# Patient Record
Sex: FEMALE | Race: WHITE | URBAN - METROPOLITAN AREA
[De-identification: names, ages, dates, MRNs, and addresses within clinical notes are randomized per-mention and may not be internally consistent; named-entity substitution may affect disease eponyms.]

---
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
## 2021-02-19 ENCOUNTER — APPOINTMENT (OUTPATIENT)
Dept: URBAN - METROPOLITAN AREA CLINIC 231 | Age: 21
Setting detail: DERMATOLOGY
End: 2021-02-19

## 2021-02-19 DIAGNOSIS — L70.0 ACNE VULGARIS: ICD-10-CM

## 2021-02-19 PROCEDURE — OTHER PRESCRIPTION: OTHER

## 2021-02-19 PROCEDURE — OTHER COUNSELING: OTHER

## 2021-02-19 PROCEDURE — 99213 OFFICE O/P EST LOW 20 MIN: CPT

## 2021-02-19 RX ORDER — CLINDAMYCIN PHOSPHATE AND BENZOYL PEROXIDE 10; 50 MG/G; MG/G
GEL TOPICAL
Qty: 50 | Refills: 2 | Status: ERX | COMMUNITY
Start: 2021-02-19

## 2021-02-19 ASSESSMENT — LOCATION DETAILED DESCRIPTION DERM
LOCATION DETAILED: RIGHT MEDIAL UPPER BACK
LOCATION DETAILED: LEFT SUPERIOR UPPER BACK
LOCATION DETAILED: RIGHT SUPERIOR UPPER BACK

## 2021-02-19 ASSESSMENT — LOCATION SIMPLE DESCRIPTION DERM
LOCATION SIMPLE: RIGHT UPPER BACK
LOCATION SIMPLE: LEFT UPPER BACK

## 2021-02-19 ASSESSMENT — LOCATION ZONE DERM: LOCATION ZONE: TRUNK

## 2021-02-19 NOTE — PROCEDURE: COUNSELING
Benzoyl Peroxide Pregnancy And Lactation Text: This medication is Pregnancy Category C. It is unknown if benzoyl peroxide is excreted in breast milk.
Birth Control Pills Counseling: Birth Control Pill Counseling: I discussed with the patient the potential side effects of OCPs including but not limited to increased risk of stroke, heart attack, thrombophlebitis, deep venous thrombosis, hepatic adenomas, breast changes, GI upset, headaches, and depression.  The patient verbalized understanding of the proper use and possible adverse effects of OCPs. All of the patient's questions and concerns were addressed.
Sarecycline Counseling: Patient advised regarding possible photosensitivity and discoloration of the teeth, skin, lips, tongue and gums.  Patient instructed to avoid sunlight, if possible.  When exposed to sunlight, patients should wear protective clothing, sunglasses, and sunscreen.  The patient was instructed to call the office immediately if the following severe adverse effects occur:  hearing changes, easy bruising/bleeding, severe headache, or vision changes.  The patient verbalized understanding of the proper use and possible adverse effects of sarecycline.  All of the patient's questions and concerns were addressed.
Tazorac Pregnancy And Lactation Text: This medication is not safe during pregnancy. It is unknown if this medication is excreted in breast milk.
Spironolactone Pregnancy And Lactation Text: This medication can cause feminization of the male fetus and should be avoided during pregnancy. The active metabolite is also found in breast milk.
Tetracycline Counseling: Patient counseled regarding possible photosensitivity and increased risk for sunburn.  Patient instructed to avoid sunlight, if possible.  When exposed to sunlight, patients should wear protective clothing, sunglasses, and sunscreen.  The patient was instructed to call the office immediately if the following severe adverse effects occur:  hearing changes, easy bruising/bleeding, severe headache, or vision changes.  The patient verbalized understanding of the proper use and possible adverse effects of tetracycline.  All of the patient's questions and concerns were addressed. Patient understands to avoid pregnancy while on therapy due to potential birth defects.
Tazorac Counseling:  Patient advised that medication is irritating and drying.  Patient may need to apply sparingly and wash off after an hour before eventually leaving it on overnight.  The patient verbalized understanding of the proper use and possible adverse effects of tazorac.  All of the patient's questions and concerns were addressed.
Sarecycline Pregnancy And Lactation Text: This medication is Pregnancy Category D and not consider safe during pregnancy. It is also excreted in breast milk.
Birth Control Pills Pregnancy And Lactation Text: This medication should be avoided if pregnant and for the first 30 days post-partum.
Bactrim Pregnancy And Lactation Text: This medication is Pregnancy Category D and is known to cause fetal risk.  It is also excreted in breast milk.
Erythromycin Pregnancy And Lactation Text: This medication is Pregnancy Category B and is considered safe during pregnancy. It is also excreted in breast milk.
Topical Clindamycin Counseling: Patient counseled that this medication may cause skin irritation or allergic reactions.  In the event of skin irritation, the patient was advised to reduce the amount of the drug applied or use it less frequently.   The patient verbalized understanding of the proper use and possible adverse effects of clindamycin.  All of the patient's questions and concerns were addressed.
Azithromycin Counseling:  I discussed with the patient the risks of azithromycin including but not limited to GI upset, allergic reaction, drug rash, diarrhea, and yeast infections.
Azithromycin Pregnancy And Lactation Text: This medication is considered safe during pregnancy and is also secreted in breast milk.
Bactrim Counseling:  I discussed with the patient the risks of sulfa antibiotics including but not limited to GI upset, allergic reaction, drug rash, diarrhea, dizziness, photosensitivity, and yeast infections.  Rarely, more serious reactions can occur including but not limited to aplastic anemia, agranulocytosis, methemoglobinemia, blood dyscrasias, liver or kidney failure, lung infiltrates or desquamative/blistering drug rashes.
Topical Clindamycin Pregnancy And Lactation Text: This medication is Pregnancy Category B and is considered safe during pregnancy. It is unknown if it is excreted in breast milk.
Topical Sulfur Applications Counseling: Topical Sulfur Counseling: Patient counseled that this medication may cause skin irritation or allergic reactions.  In the event of skin irritation, the patient was advised to reduce the amount of the drug applied or use it less frequently.   The patient verbalized understanding of the proper use and possible adverse effects of topical sulfur application.  All of the patient's questions and concerns were addressed.
Dapsone Counseling: I discussed with the patient the risks of dapsone including but not limited to hemolytic anemia, agranulocytosis, rashes, methemoglobinemia, kidney failure, peripheral neuropathy, headaches, GI upset, and liver toxicity.  Patients who start dapsone require monitoring including baseline LFTs and weekly CBCs for the first month, then every month thereafter.  The patient verbalized understanding of the proper use and possible adverse effects of dapsone.  All of the patient's questions and concerns were addressed.
Isotretinoin Pregnancy And Lactation Text: This medication is Pregnancy Category X and is considered extremely dangerous during pregnancy. It is unknown if it is excreted in breast milk.
Doxycycline Pregnancy And Lactation Text: This medication is Pregnancy Category D and not consider safe during pregnancy. It is also excreted in breast milk but is considered safe for shorter treatment courses.
Benzoyl Peroxide Counseling: Patient counseled that medicine may cause skin irritation and bleach clothing.  In the event of skin irritation, the patient was advised to reduce the amount of the drug applied or use it less frequently.   The patient verbalized understanding of the proper use and possible adverse effects of benzoyl peroxide.  All of the patient's questions and concerns were addressed.
Detail Level: Zone
Include Pregnancy/Lactation Warning?: No
High Dose Vitamin A Pregnancy And Lactation Text: High dose vitamin A therapy is contraindicated during pregnancy and breast feeding.
Isotretinoin Counseling: Patient should get monthly blood tests, not donate blood, not drive at night if vision affected, not share medication, and not undergo elective surgery for 6 months after tx completed. Side effects reviewed, pt to contact office should one occur.
High Dose Vitamin A Counseling: Side effects reviewed, pt to contact office should one occur.
Topical Retinoid Pregnancy And Lactation Text: This medication is Pregnancy Category C. It is unknown if this medication is excreted in breast milk.
Topical Retinoid counseling:  Patient advised to apply a pea-sized amount only at bedtime and wait 30 minutes after washing their face before applying.  If too drying, patient may add a non-comedogenic moisturizer. The patient verbalized understanding of the proper use and possible adverse effects of retinoids.  All of the patient's questions and concerns were addressed.
Erythromycin Counseling:  I discussed with the patient the risks of erythromycin including but not limited to GI upset, allergic reaction, drug rash, diarrhea, increase in liver enzymes, and yeast infections.
Spironolactone Counseling: Patient advised regarding risks of diarrhea, abdominal pain, hyperkalemia, birth defects (for female patients), liver toxicity and renal toxicity. The patient may need blood work to monitor liver and kidney function and potassium levels while on therapy. The patient verbalized understanding of the proper use and possible adverse effects of spironolactone.  All of the patient's questions and concerns were addressed.
Dapsone Pregnancy And Lactation Text: This medication is Pregnancy Category C and is not considered safe during pregnancy or breast feeding.
Topical Sulfur Applications Pregnancy And Lactation Text: This medication is Pregnancy Category C and has an unknown safety profile during pregnancy. It is unknown if this topical medication is excreted in breast milk.
Minocycline Counseling: Patient advised regarding possible photosensitivity and discoloration of the teeth, skin, lips, tongue and gums.  Patient instructed to avoid sunlight, if possible.  When exposed to sunlight, patients should wear protective clothing, sunglasses, and sunscreen.  The patient was instructed to call the office immediately if the following severe adverse effects occur:  hearing changes, easy bruising/bleeding, severe headache, or vision changes.  The patient verbalized understanding of the proper use and possible adverse effects of minocycline.  All of the patient's questions and concerns were addressed.
Doxycycline Counseling:  Patient counseled regarding possible photosensitivity and increased risk for sunburn.  Patient instructed to avoid sunlight, if possible.  When exposed to sunlight, patients should wear protective clothing, sunglasses, and sunscreen.  The patient was instructed to call the office immediately if the following severe adverse effects occur:  hearing changes, easy bruising/bleeding, severe headache, or vision changes.  The patient verbalized understanding of the proper use and possible adverse effects of doxycycline.  All of the patient's questions and concerns were addressed.

## 2021-03-07 NOTE — PROCEDURE: ISOTRETINOIN INITIATION
PRINCIPAL DISCHARGE DIAGNOSIS  Diagnosis: COVID-19  Assessment and Plan of Treatment: CORONAVIRUS INSTRUCTIONS:   Based on your current clinical status and stability, it has been determined that you no longer need hospitalization and can recover while remaining in self-quarantine at home. You should follow the prevention steps below until a healthcare provider or local or state health department says you can return to your normal activities.   1. You should restrict activities outside your home, except for getting medical care.   2. Do not go to work, school, or public areas.   3. Avoid using public transportation, ride-sharing, or taxis.   4. Separate yourself from other people and animals in your home as much as possible.  When you are around other people (e.g., sharing a room or vehicle) you should wear a facemask.  5. Wash your hands often with soap and water for at least 20 seconds, especially after blowing your nose, coughing, or sneezing; going to the bathroom; and before eating or preparing food.  6. Cover your mouth and nose with a tissue when you cough or sneeze. Throw used tissues in a lined trash can. Immediately wash your hands with soap and water for at least 20 seconds  7. High touch surfaces include counters, tabletops, doorknobs, bathroom fixtures, toilets, phones, keyboards, tablets, and bedside tables.  8. Avoid sharing dishes, drinking glasses, cups, eating utensils, towels, or bedding with other people or pets in your home. After using these items, they should be washed thoroughly with soap and water.  You are strongly advised to seek prompt medical attention if your illness worsens or you develop new symptoms like fever or difficulty breathing.   Please call  [phone] to speak with your discharging physician if you have any questions.        SECONDARY DISCHARGE DIAGNOSES  Diagnosis: Dementia  Assessment and Plan of Treatment: HOME CARE INSTRUCTIONS  The care of individuals with dementia is varied and dependent upon the progression of the dementia. The following suggestions are intended for the person living with, or caring for, the person with dementia.  Create a safe environment.  Remove the locks on bathroom doors to prevent the person from accidentally locking himself or herself in.  Use childproof latches on kitchen cabinets and any place where cleaning supplies, chemicals, or alcohol are kept.  Use childproof covers in unused electrical outlets.  Install childproof devices to keep doors and windows secured.  Remove stove knobs or install safety knobs and an automatic shut-off on the stove.  Lower the temperature on water heaters.  Label medicines and keep them locked up.  Secure knives, lighters, matches, power tools, and guns, and keep these items out of reach.  Keep the house free from clutter. Remove rugs or anything that might contribute to a fall.  Remove objects that might break and hurt the person.  Make sure lighting is good, both inside and outside.  Install grab rails as needed.  Use a monitoring device to alert you to falls or other needs for help.   Reduce confusion.  Keep familiar objects and people around.  Use night lights or dim lights at night.  Label items or areas.  Use reminders, notes, or directions for daily activities or tasks.Keep a simple, consistent routine for waking, meals, bathing, dressing, and bedtime  Create a calm, quiet environment.  Place large clocks and calendars prominently.  Display emergency numbers and home address near all telephones..  Have a consistent nighttime routine.  Ensure a regular walking or physical activity schedule. Involve the person in daily activities as much as possible.  Limit napping during the day.  Limit caffeine.  Attend social events that stimulate rather than overwhelm the senses.  Encourage good nutrition and hydration.  Reduce distractions during meal times and snacks..  Monitor chewing and swallowing ability.  Continue with routine vision, hearin    Diagnosis: Afib  Assessment and Plan of Treatment: Atrial fibrillation is the most common heart rhythm problem.  The condition puts you at risk for has stroke and heart attack  Call your doctor if you feel your heart racing or beating unusually, chest tightness or pain, lightheaded, faint, shortness of breath especially with exercise  It is important to take your heart medication as prescribed  You may be on anticoagulation which is very important to take as directed - you may need blood work to monitor drug levels      Diagnosis: Altered mental status, unspecified altered mental status type  Assessment and Plan of Treatment:      PRINCIPAL DISCHARGE DIAGNOSIS  Diagnosis: COVID-19  Assessment and Plan of Treatment: CORONAVIRUS INSTRUCTIONS:   Based on your current clinical status and stability, it has been determined that you no longer need hospitalization and can recover while remaining in self-quarantine at home. You should follow the prevention steps below until a healthcare provider or local or state health department says you can return to your normal activities.   1. Wash your hands often with soap and water for at least 20 seconds, especially after blowing your nose, coughing, or sneezing; going to the bathroom; and before eating or preparing food.  2. Cover your mouth and nose with a tissue when you cough or sneeze. Throw used tissues in a lined trash can. Immediately wash your hands with soap and water for at least 20 seconds  3. High touch surfaces include counters, tabletops, doorknobs, bathroom fixtures, toilets, phones, keyboards, tablets, and bedside tables.  4.  Avoid sharing dishes, drinking glasses, cups, eating utensils, towels, or bedding with other people or pets in your home. After using these items, they should be washed thoroughly with soap and water.  You are strongly advised to seek prompt medical attention if your illness worsens or you develop new symptoms like fever or difficulty breathing.   Please call   to speak with your discharging physician if you have any questions.        SECONDARY DISCHARGE DIAGNOSES  Diagnosis: Afib  Assessment and Plan of Treatment: Atrial fibrillation is the most common heart rhythm problem.  The condition puts you at risk for has stroke and heart attack  Call your doctor if you feel your heart racing or beating unusually, chest tightness or pain, lightheaded, faint, shortness of breath especially with exercise  It is important to take your heart medication as prescribed  You may be on anticoagulation which is very important to take as directed - you may need blood work to monitor drug levels      Diagnosis: Dementia  Assessment and Plan of Treatment: HOME CARE INSTRUCTIONS  The care of individuals with dementia is varied and dependent upon the progression of the dementia. The following suggestions are intended for the person living with, or caring for, the person with dementia.  Create a safe environment.  Remove the locks on bathroom doors to prevent the person from accidentally locking himself or herself in.  Use childproof latches on kitchen cabinets and any place where cleaning supplies, chemicals, or alcohol are kept.  Use childproof covers in unused electrical outlets.  Install childproof devices to keep doors and windows secured.  Remove stove knobs or install safety knobs and an automatic shut-off on the stove.  Lower the temperature on water heaters.  Label medicines and keep them locked up.  Secure knives, lighters, matches, power tools, and guns, and keep these items out of reach.  Keep the house free from clutter. Remove rugs or anything that might contribute to a fall.  Remove objects that might break and hurt the person.  Make sure lighting is good, both inside and outside.  Install grab rails as needed.  Use a monitoring device to alert you to falls or other needs for help.   Reduce confusion.  Keep familiar objects and people around.  Use night lights or dim lights at night.  Label items or areas.  Use reminders, notes, or directions for daily activities or tasks.Keep a simple, consistent routine for waking, meals, bathing, dressing, and bedtime  Create a calm, quiet environment.  Place large clocks and calendars prominently.  Display emergency numbers and home address near all telephones..  Have a consistent nighttime routine.  Ensure a regular walking or physical activity schedule. Involve the person in daily activities as much as possible.  Limit napping during the day.  Limit caffeine.  Attend social events that stimulate rather than overwhelm the senses.  Encourage good nutrition and hydration.  Reduce distractions during meal times and snacks..  Monitor chewing and swallowing ability.  Continue with routine vision, hearin     PRINCIPAL DISCHARGE DIAGNOSIS  Diagnosis: COVID-19  Assessment and Plan of Treatment:   You completed quarantine period in the hospital  Please read below for general precaution for COVID   CORONAVIRUS INSTRUCTIONS:     1. Wash your hands often with soap and water for at least 20 seconds, especially after blowing your nose, coughing, or sneezing; going to the bathroom; and before eating or preparing food.  2. Cover your mouth and nose with a tissue when you cough or sneeze. Throw used tissues in a lined trash can. Immediately wash your hands with soap and water for at least 20 seconds  3. High touch surfaces include counters, tabletops, doorknobs, bathroom fixtures, toilets, phones, keyboards, tablets, and bedside tables.  4.  Avoid sharing dishes, drinking glasses, cups, eating utensils, towels, or bedding with other people or pets in your home. After using these items, they should be washed thoroughly with soap and water.  You are strongly advised to seek prompt medical attention if your illness worsens or you develop new symptoms like fever or difficulty breathing.   Please call  [phone] to speak with your discharging physician if you have any questions.  If  yo udevelop shortness of reath, fever call PMD ASAP         SECONDARY DISCHARGE DIAGNOSES  Diagnosis: Afib  Assessment and Plan of Treatment: Atrial fibrillation is the most common heart rhythm problem.  The condition puts you at risk for has stroke and heart attack  Call your doctor if you feel your heart racing or beating unusually, chest tightness or pain, lightheaded, faint, shortness of breath especially with exercise  It is important to take your heart medication as prescribed  You may be on anticoagulation which is very important to take as directed - you may need blood work to monitor drug levels      Diagnosis: Dementia  Assessment and Plan of Treatment: HOME CARE INSTRUCTIONS  The care of individuals with dementia is varied and dependent upon the progression of the dementia. The following suggestions are intended for the person living with, or caring for, the person with dementia.  Create a safe environment.  Remove the locks on bathroom doors to prevent the person from accidentally locking himself or herself in.  Use childproof latches on kitchen cabinets and any place where cleaning supplies, chemicals, or alcohol are kept.  Use childproof covers in unused electrical outlets.  Install childproof devices to keep doors and windows secured.  Remove stove knobs or install safety knobs and an automatic shut-off on the stove.  Lower the temperature on water heaters.  Label medicines and keep them locked up.  Secure knives, lighters, matches, power tools, and guns, and keep these items out of reach.  Keep the house free from clutter. Remove rugs or anything that might contribute to a fall.  Remove objects that might break and hurt the person.  Make sure lighting is good, both inside and outside.  Install grab rails as needed.  Use a monitoring device to alert you to falls or other needs for help.   Reduce confusion.  Keep familiar objects and people around.  Use night lights or dim lights at night.  Label items or areas.  Use reminders, notes, or directions for daily activities or tasks.Keep a simple, consistent routine for waking, meals, bathing, dressing, and bedtime  Create a calm, quiet environment.  Place large clocks and calendars prominently.  Display emergency numbers and home address near all telephones..  Have a consistent nighttime routine.  Ensure a regular walking or physical activity schedule. Involve the person in daily activities as much as possible.  Limit napping during the day.  Limit caffeine.  Attend social events that stimulate rather than overwhelm the senses.  Encourage good nutrition and hydration.  Reduce distractions during meal times and snacks..  Monitor chewing and swallowing ability.  Continue with routine vision, hearin     PRINCIPAL DISCHARGE DIAGNOSIS  Diagnosis: COVID-19  Assessment and Plan of Treatment:   You completed quarantine period in the hospital  Please read below for general precaution for COVID   CORONAVIRUS INSTRUCTIONS:     1. Wash your hands often with soap and water for at least 20 seconds, especially after blowing your nose, coughing, or sneezing; going to the bathroom; and before eating or preparing food.  2. Cover your mouth and nose with a tissue when you cough or sneeze. Throw used tissues in a lined trash can. Immediately wash your hands with soap and water for at least 20 seconds  3. High touch surfaces include counters, tabletops, doorknobs, bathroom fixtures, toilets, phones, keyboards, tablets, and bedside tables.  4.  Avoid sharing dishes, drinking glasses, cups, eating utensils, towels, or bedding with other people or pets in your home. After using these items, they should be washed thoroughly with soap and water.  You are strongly advised to seek prompt medical attention if your illness worsens or you develop new symptoms like fever or difficulty breathing.   Please call  [phone] to speak with your discharging physician if you have any questions.  If  yo udevelop shortness of reath, fever call PMD ASAP         SECONDARY DISCHARGE DIAGNOSES  Diagnosis: Afib  Assessment and Plan of Treatment: Atrial fibrillation is the most common heart rhythm problem.  The condition puts you at risk for has stroke and heart attack  Call your doctor if you feel your heart racing or beating unusually, chest tightness or pain, lightheaded, faint, shortness of breath especially with exercise  It is important to take your heart medication as prescribed  You may be on anticoagulation which is very important to take as directed - you may need blood work to monitor drug levels      Diagnosis: Dementia  Assessment and Plan of Treatment: HOME CARE INSTRUCTIONS  The care of individuals with dementia is varied and dependent upon the progression of the dementia. The following suggestions are intended for the person living with, or caring for, the person with dementia.  Create a safe environment.  Remove the locks on bathroom doors to prevent the person from accidentally locking himself or herself in.  Use childproof latches on kitchen cabinets and any place where cleaning supplies, chemicals, or alcohol are kept.  Use childproof covers in unused electrical outlets.  Install childproof devices to keep doors and windows secured.  Remove stove knobs or install safety knobs and an automatic shut-off on the stove.  Lower the temperature on water heaters.  Label medicines and keep them locked up.  Secure knives, lighters, matches, power tools, and guns, and keep these items out of reach.  Keep the house free from clutter. Remove rugs or anything that might contribute to a fall.  Remove objects that might break and hurt the person.  Make sure lighting is good, both inside and outside.  Install grab rails as needed.  Use a monitoring device to alert you to falls or other needs for help.   Reduce confusion.  Keep familiar objects and people around.  Use night lights or dim lights at night.  Label items or areas.  Use reminders, notes, or directions for daily activities or tasks.Keep a simple, consistent routine for waking, meals, bathing, dressing, and bedtime  Create a calm, quiet environment.  Place large clocks and calendars prominently.  Display emergency numbers and home address near all telephones..  Have a consistent nighttime routine.  Ensure a regular walking or physical activity schedule. Involve the person in daily activities as much as possible.  Limit napping during the day.  Limit caffeine.  Attend social events that stimulate rather than overwhelm the senses.  Encourage good nutrition and hydration.  Reduce distractions during meal times and snacks..  Monitor chewing and swallowing ability.  Continue with routine vision, hearin    Diagnosis: Heart failure  Assessment and Plan of Treatment: Weigh yourself daily.  If you gain 3lbs in 3 days, or 5lbs in a week call your Health Care Provider.  Do not eat or drink foods containing more than 2000mg of salt (sodium) in your diet every day.  Call your Health Care Provider if you have any swelling or increased swelling in your feet, ankles, and/or stomach.  Take all of your medication as directed.  If you become dizzy call your Health Care Provider.       Patient Reported Weight (Optional - Include Units): 128lbs

## 2021-09-03 ENCOUNTER — APPOINTMENT (OUTPATIENT)
Dept: URBAN - METROPOLITAN AREA CLINIC 231 | Age: 21
Setting detail: DERMATOLOGY
End: 2021-09-04

## 2021-09-03 DIAGNOSIS — L42 PITYRIASIS ROSEA: ICD-10-CM

## 2021-09-03 PROCEDURE — OTHER COUNSELING: OTHER

## 2021-09-03 PROCEDURE — OTHER PRESCRIPTION: OTHER

## 2021-09-03 PROCEDURE — 99213 OFFICE O/P EST LOW 20 MIN: CPT

## 2021-09-03 RX ORDER — TRIAMCINOLONE ACETONIDE 1 MG/G
CREAM TOPICAL BID
Qty: 1 | Refills: 0 | Status: ERX | COMMUNITY
Start: 2021-09-03

## 2021-09-03 ASSESSMENT — LOCATION DETAILED DESCRIPTION DERM
LOCATION DETAILED: RIGHT LATERAL BREAST 7-8:00 REGION
LOCATION DETAILED: LEFT RIB CAGE
LOCATION DETAILED: RIGHT DISTAL POSTERIOR THIGH
LOCATION DETAILED: RIGHT MEDIAL BREAST 5-6:00 REGION

## 2021-09-03 ASSESSMENT — LOCATION SIMPLE DESCRIPTION DERM
LOCATION SIMPLE: RIGHT BREAST
LOCATION SIMPLE: ABDOMEN
LOCATION SIMPLE: RIGHT POSTERIOR THIGH

## 2021-09-03 ASSESSMENT — LOCATION ZONE DERM
LOCATION ZONE: LEG
LOCATION ZONE: TRUNK

## 2021-09-07 ENCOUNTER — RX ONLY (RX ONLY)
Age: 21
End: 2021-09-07

## 2021-09-07 RX ORDER — TRIAMCINOLONE ACETONIDE 1 MG/G
CREAM TOPICAL BID
Qty: 1 | Refills: 0 | Status: ERX

## 2021-12-09 ENCOUNTER — APPOINTMENT (OUTPATIENT)
Dept: URBAN - METROPOLITAN AREA CLINIC 231 | Age: 21
Setting detail: DERMATOLOGY
End: 2021-12-09

## 2021-12-09 DIAGNOSIS — L23.7 ALLERGIC CONTACT DERMATITIS DUE TO PLANTS, EXCEPT FOOD: ICD-10-CM

## 2021-12-09 PROCEDURE — OTHER COUNSELING: OTHER

## 2021-12-09 PROCEDURE — 99213 OFFICE O/P EST LOW 20 MIN: CPT

## 2021-12-09 PROCEDURE — OTHER MIPS QUALITY: OTHER

## 2021-12-09 PROCEDURE — OTHER ADDITIONAL NOTES: OTHER

## 2021-12-09 PROCEDURE — OTHER PRESCRIPTION: OTHER

## 2021-12-09 PROCEDURE — OTHER TREATMENT REGIMEN: OTHER

## 2021-12-09 RX ORDER — METHYLPREDNISOLONE 4 MG/1
TABLET ORAL
Qty: 1 | Refills: 0 | Status: ERX | COMMUNITY
Start: 2021-12-09

## 2021-12-09 RX ORDER — CLOBETASOL PROPIONATE 0.5 MG/G
CREAM TOPICAL
Qty: 30 | Refills: 0 | Status: ERX | COMMUNITY
Start: 2021-12-09

## 2021-12-09 ASSESSMENT — LOCATION ZONE DERM
LOCATION ZONE: ARM
LOCATION ZONE: LEG
LOCATION ZONE: TRUNK

## 2021-12-09 ASSESSMENT — LOCATION DETAILED DESCRIPTION DERM
LOCATION DETAILED: PERIUMBILICAL SKIN
LOCATION DETAILED: LEFT ANTERIOR SHOULDER
LOCATION DETAILED: INFERIOR THORACIC SPINE
LOCATION DETAILED: LEFT ANTERIOR PROXIMAL THIGH

## 2021-12-09 ASSESSMENT — LOCATION SIMPLE DESCRIPTION DERM
LOCATION SIMPLE: LEFT THIGH
LOCATION SIMPLE: LEFT SHOULDER
LOCATION SIMPLE: UPPER BACK
LOCATION SIMPLE: ABDOMEN

## 2021-12-09 NOTE — PROCEDURE: ADDITIONAL NOTES
Additional Notes: Recommended making an appointment with Dr. Niranjan Hull
Detail Level: Simple
Render Risk Assessment In Note?: no

## 2021-12-09 NOTE — PROCEDURE: MIPS QUALITY
Quality 431: Preventive Care And Screening: Unhealthy Alcohol Use - Screening: Patient not identified as an unhealthy alcohol user when screened for unhealthy alcohol use using a systematic screening method
Detail Level: Detailed
Quality 130: Documentation Of Current Medications In The Medical Record: Current Medications Documented
Quality 110: Preventive Care And Screening: Influenza Immunization: Influenza Immunization not Administered for Documented Reasons.
Quality 226: Preventive Care And Screening: Tobacco Use: Screening And Cessation Intervention: Patient screened for tobacco use and is an ex/non-smoker

## 2021-12-23 ENCOUNTER — APPOINTMENT (OUTPATIENT)
Dept: URBAN - METROPOLITAN AREA CLINIC 231 | Age: 21
Setting detail: DERMATOLOGY
End: 2021-12-23

## 2021-12-23 DIAGNOSIS — L23.7 ALLERGIC CONTACT DERMATITIS DUE TO PLANTS, EXCEPT FOOD: ICD-10-CM

## 2021-12-23 PROBLEM — L30.9 DERMATITIS, UNSPECIFIED: Status: ACTIVE | Noted: 2021-12-23

## 2021-12-23 PROCEDURE — 11104 PUNCH BX SKIN SINGLE LESION: CPT

## 2021-12-23 PROCEDURE — OTHER BIOPSY BY PUNCH METHOD: OTHER

## 2021-12-23 PROCEDURE — OTHER COUNSELING: OTHER

## 2021-12-23 PROCEDURE — OTHER PRESCRIPTION: OTHER

## 2021-12-23 RX ORDER — CLOBETASOL PROPIONATE 0.5 MG/G
CREAM TOPICAL
Qty: 30 | Refills: 0 | Status: ERX

## 2021-12-23 ASSESSMENT — LOCATION SIMPLE DESCRIPTION DERM
LOCATION SIMPLE: LEFT THIGH
LOCATION SIMPLE: UPPER BACK
LOCATION SIMPLE: LEFT SHOULDER
LOCATION SIMPLE: RIGHT POSTERIOR THIGH
LOCATION SIMPLE: ABDOMEN

## 2021-12-23 ASSESSMENT — LOCATION DETAILED DESCRIPTION DERM
LOCATION DETAILED: INFERIOR THORACIC SPINE
LOCATION DETAILED: RIGHT DISTAL POSTERIOR THIGH
LOCATION DETAILED: PERIUMBILICAL SKIN
LOCATION DETAILED: LEFT ANTERIOR PROXIMAL THIGH
LOCATION DETAILED: LEFT ANTERIOR SHOULDER

## 2021-12-23 ASSESSMENT — LOCATION ZONE DERM
LOCATION ZONE: ARM
LOCATION ZONE: LEG
LOCATION ZONE: TRUNK

## 2021-12-23 NOTE — PROCEDURE: BIOPSY BY PUNCH METHOD
Path Notes (To The Dermatopathologist): 3mm\\Jose Manuel
Post-Care Instructions: I reviewed with the patient in detail post-care instructions. Patient is to keep the biopsy site dry overnight, and then apply bacitracin twice daily until healed. Patient may apply hydrogen peroxide soaks to remove any crusting.
Additional Anesthesia Volume In Cc (Will Not Render If 0): 0
Billing Type: Third-Party Bill
Validate That Anesthesia Is Not 0: No
Wound Care: Petrolatum
Biopsy Type: H and E
Anesthesia Type: 1% lidocaine with epinephrine
Home Suture Removal Text: Patient was provided a home suture removal kit and will remove their sutures at home.  If they have any questions or difficulties they will call the office.
Information: Selecting Yes will display possible errors in your note based on the variables you have selected. This validation is only offered as a suggestion for you. PLEASE NOTE THAT THE VALIDATION TEXT WILL BE REMOVED WHEN YOU FINALIZE YOUR NOTE. IF YOU WANT TO FAX A PRELIMINARY NOTE YOU WILL NEED TO TOGGLE THIS TO 'NO' IF YOU DO NOT WANT IT IN YOUR FAXED NOTE.
Suture Removal: 12 days
Was A Bandage Applied: Yes
Hemostasis: None
Punch Size In Mm: 3
Epidermal Sutures: 4-0 Nylon
Dressing: bandage
Detail Level: Detailed
Notification Instructions: Patient will be notified of biopsy results. However, patient instructed to call the office if not contacted within 2 weeks.
Consent: Written consent was obtained and risks were reviewed including but not limited to scarring, infection, bleeding, scabbing, incomplete removal, nerve damage and allergy to anesthesia.
Anesthesia Volume In Cc (Will Not Render If 0): 0.5

## 2022-01-03 ENCOUNTER — APPOINTMENT (OUTPATIENT)
Dept: URBAN - METROPOLITAN AREA CLINIC 231 | Age: 22
Setting detail: DERMATOLOGY
End: 2022-01-03

## 2022-01-03 DIAGNOSIS — L23.7 ALLERGIC CONTACT DERMATITIS DUE TO PLANTS, EXCEPT FOOD: ICD-10-CM

## 2022-01-03 PROBLEM — L30.9 DERMATITIS, UNSPECIFIED: Status: ACTIVE | Noted: 2022-01-03

## 2022-01-03 PROCEDURE — OTHER SUTURE REMOVAL (NO GLOBAL PERIOD): OTHER

## 2022-01-03 PROCEDURE — 99213 OFFICE O/P EST LOW 20 MIN: CPT

## 2022-01-03 PROCEDURE — OTHER PRESCRIPTION MEDICATION MANAGEMENT: OTHER

## 2022-01-03 PROCEDURE — OTHER MIPS QUALITY: OTHER

## 2022-01-03 PROCEDURE — OTHER COUNSELING: OTHER

## 2022-01-03 ASSESSMENT — LOCATION SIMPLE DESCRIPTION DERM
LOCATION SIMPLE: LEFT THIGH
LOCATION SIMPLE: UPPER BACK
LOCATION SIMPLE: ABDOMEN
LOCATION SIMPLE: RIGHT THIGH
LOCATION SIMPLE: LEFT SHOULDER

## 2022-01-03 ASSESSMENT — LOCATION ZONE DERM
LOCATION ZONE: LEG
LOCATION ZONE: TRUNK
LOCATION ZONE: ARM

## 2022-01-03 ASSESSMENT — LOCATION DETAILED DESCRIPTION DERM
LOCATION DETAILED: LEFT ANTERIOR PROXIMAL THIGH
LOCATION DETAILED: LEFT ANTERIOR SHOULDER
LOCATION DETAILED: INFERIOR THORACIC SPINE
LOCATION DETAILED: PERIUMBILICAL SKIN
LOCATION DETAILED: RIGHT ANTERIOR PROXIMAL THIGH

## 2022-01-03 NOTE — PROCEDURE: MIPS QUALITY
Detail Level: Detailed
Quality 226: Preventive Care And Screening: Tobacco Use: Screening And Cessation Intervention: Patient screened for tobacco use and is an ex/non-smoker
Quality 431: Preventive Care And Screening: Unhealthy Alcohol Use - Screening: Patient not identified as an unhealthy alcohol user when screened for unhealthy alcohol use using a systematic screening method
Quality 130: Documentation Of Current Medications In The Medical Record: Current Medications Documented
Quality 110: Preventive Care And Screening: Influenza Immunization: Influenza Immunization Administered during Influenza season

## 2023-05-22 NOTE — PROCEDURE: ISOTRETINOIN MONITORING
Geriatric Psychosocial Assessment and Care Plan   Participants in Assessment  Patient Stephanie, her  John and son Jass.     Patient Concerns  Tell us what brings you in today?  Stephanie expresses concerns with her memory and desire to get 's license back. Reporting, “I am disappointed that I am not able to drive.”  John expresses concerns with Stephanie's desire to hike independently and has been looking at technology to assist including [employer] GPS tracker. Asking how to give Stephanie autonomy in the best possible ways.  Per Jass, struggles with negative content in emails. Reports Stephanie's brother  by suicide a few years ago. Reports that his mom often feels sad about her borther and talks about this loss frequently. Father also passed 2 years ago, but does not appear to be talked about as much as her brother. Jass wonders if his mom would benefit from therapy.     Of note, Stephanie has a significant delay in her ability to respond to questions. At times she is not able to respond at all. Word finding difficulty apparent.     Strengths/What Matters  What gives meaning and julia to your life?  “Hiking. It's my favorite. Or go walking. Yoga everyday. Family. Going to Jass's house on Saturday. Watching my games. I'm a big Bears fan. My dogs.”  What your limitations? “None of that going on.”    Family & Support System     How long have you been ? 40 years in October.     Children (First name; state where living):   3 sons; Mike in [place], Eldon in [place] and Jass in [place].  Jass works for [employer] and is familiar with geriatric services in the area. Jass has reached out to this team previously for ideas on how to best support his mother.    Total number of grandchildren = 1 grandchild and 1 expected.     Tell me who is part of your inner/social [place]? “Not too any anymore, Lindsay Yap and Kim.” Talks with them on the phone a couple times a week plus texting.     Tell me about other people you  connect with? (friends/neighbors/mentors) Goes hiking with friends a few times x month.     Current Living Environment  Description of home and household members:  Tell me about where you live? 1 story.   How long have you lived there? 32 years.   Do you have a modified bathroom? (Grab bars, shower chair, raised toilet seat?)  No accomodations yet.   Do you have any adaptive equipment: No equipment.     Typical day/Appetite/Exercise and Social Activity (Sleep routine; activities)  Tell me about your typical day from when you wake up until you go to bed?  Wakes up about 6:00am. Take my thyroid medication first thing. Goes back to sleep until 8am. Feed dogs. Do sweeping in the house, yoga and then eat breakfast. Breakfast is the same every day. Cereal with blueberries and raspberries, tea. Sits and reads emails on her phone. No lunch. Wants to be outside and is often hiking in the middle of the day with her dogs, 2 Mauritian Shepherds, Sonya and Grazyna. John drives her to a hiking trail where she she stays for 3-4 hours while he goes to work. Uses GPS tracking that gives an updated location every 10 minutes, sent to John's phone. Point to point hike vs. loop. Walks the dogs through the neighborhood. Prefers to be outside most of the day. Comes home about 4:30/5pm. Makes her own dinner. Prefers a salad or Turks and Caicos Islander food and mandarin oranges. Limited fluids. Watches a game on TV or keep track of scores on phone. Goes for an evening walk by herself. Some repetitive behavior of cleaning up in the evening before bed. 9:00-9:30pm bedtime. Comes in/out of bedroom frequently before falling asleep. Unclear how much she is actually sleeping. Appears that she sleeps until 5:30am. Showers every couple of days and family members have needed to remind her.     Confucianist-Spiritual/Cultural Systems  Are you a part of a Yarsanism/spiritual community? No.   What do you believe in? Being in nature and connected to the  lalita.    Education/Work/  What was the highest grade you completed? High school plus [employer] as an adult for certifications. “Certified Payroll Bernadette”.  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   Did you serve in the ? VA benefits? No.      Advance Directives  Are you familiar with Advance Care planning documents? (Living Trust, Living Will, etc.)  Yes. Has a trust document. Stephanie is unable to relay who her decision maker is at this time. John confirmed that he is the primary and his son is the secondary.    Do you have an Advanced Directive? Yes.   Will you share who that person is?  Agents for [employer]: Spouse John, secondary is son Eldon.    Sources of Income  What are your sources of income? SSA + pension + investments  Monthly income (Pre-complete from intake paperwork if available): Approximatly $5000/month household income.   Is your income sufficient to meet your monthly expenses? Yes.    Do you receive any additional assistance? (Food bank, SNAP, utility assistance, etc.) No.     Behavioral Health  Alcohol consumption: Do you drink alcohol? How often? Denies.   Do you use tobacco products? Vaping? Denies.  Do you currently use medicinal marijuana, CBD or street drugs? Denies.      Behavioral Health/Safety  Are you fearful at home? No.   Do you feel safe in your neighborhood? Would you walk around the neighborhood block? Yes.   Do you feel safe with the people you live with? Yes.   Do you see a therapist or counselor? “No.  I am interested.”   Do you see a psychiatrist? No.     What's your current stress level (1-5): 1    Tell me about your mood in the last two weeks? “Good.”  Per son, some periods of irritability mainly with word finding difficulty or awareness about inability to complete a task.   SW does not believe that patient would benefit from psychotherapy due to significant cognitive impairment. May find benefit in a support group  with other persons who are coping with early onset.     Suicidal Ideation screening: (CSRS screening if yes)  Do you have any current thoughts of suicide? Denies.   Do you have any current thoughts that you would be better off dead? Denies.   Do you have any thoughts of wanting to harm another? Denies.   Do you have family history of suicide? Yes. Brother  by suicide approximately 3 years ago. Unexpected.     MENTAL STATUS/OBSERVATIONS (Make notes and use the EPIC version)  MSE has been updated. Please reference screening tab for reference.  Plans and Goals  Tell me what you are looking forward to?  Per Jonh numerous travel arrangements are scheduled this summer. Focus on traveling now and making memories as a couple/family. Will be going to [place], [place], 2-week [facility] and [place] x 4 months in the winter.    Social Work Care Plan    We hear that being outside in nature hiking brings you julia. We are concerned about the length of time that Stephanie is out in the wilderness without other people and limited access to shade and hydration. While we support Stephanie's desire for independence and alone time, consider how to do this within a harm reduction framework.    Locate and purchase a medical safety alert watch or other device to assist in managing elopement from the house. There is a private pay cost to this type of service, typically a monthly fee varying from $10-$40/month. Options can be found on; Amazon.com, Dou[employer]oon and [employer].cn. We hear that you are also looking at gps options for hiking and will be trialing options.     Consider attending [employer]. They have educational, social, cultural, and recreational programs. $85/year membership with access to all programming. [phone]; [email or web address] We think you would specifically enjoy the long-distance hiking and Thursday hikers' groups.    Consider engaging with Dementia Friendly  [place]: Join people living with dementia, family care partners, and interested community members for a weekly conversation about living well with dementia. All sessions are hosted by a person living with dementia and a family care partner.  Dementia Conversations with Clayton and Na: Mondays from 10:00 - 11:30 AM  Dementia Conversations with Lamar and Ann: Fridays from 1:00 - 2:30 PM  To register, visit [email or web address]      Consider connecting to the [facility] Alzheimer's Association specific to the Younger Onset groups and education. You may find this information to be the most helpful to your specific situation.   https://www.alz.org/help-support/i-have-alz/younger-onset    Caregiving Support  For future reference, we want you to be aware of the following resources that may be helpful to you.    Consider a home safety evaluation to have safety recommendations made specific to aging. An option is occupational therapist Mini Salazar. She has years of experience doing in-home safety evaluations. You can contact her at [phone], [email or web address]. There is a cost to this service that is not covered by insurance, approximately $200-250.     For caregiver support, contact the Alzheimer's Association at 1-493.660.4124 or via their website, https://www.alz.org/siobhan. They provide support groups, education and 24-hour support. They also have a respite collins they can help you apply for to use for various services. $1000/year, possibly more depending on needs.     Research an Adult Day Health Program. These services provide socialization, exercise and live entertainment. Some options in our area are:  [facility] - [address] [place] [address] ([phone])  [facility] -[address], [place] [address].   [phone]. Expanded hours to come, currently 10:00 a.m. - 3:00 p.m.  Review the [place] Caregivers Guide  enclosed in your packet. It is a wealth of information. You can also find this guide online at https://[place]adaVersafe.org/documents/. [place]Vascular Magnetics.org is a comprehensive database of resources created by the [facility] in partnership with the [facility] and the [facility] [employer].   Consider connecting to Dementia Friend[facility] [place] and join the Dementia Friends Information Session. 12:00-1:30pm on the 3rd Wednesday of every month via Zoom. This group covers the basics of dementia, discuss differences between dementia and normal aging, go through activities to better understand what it's like to live with dementia, and then review strategies for effective communication with someone living with dementia.   [email or web address]/dementia-friends    Review the Manual About Living with Dementia via the [facility]ementia [facility] [place]. This is a thorough resource of information for understanding a dementia diagnosis that was co-authored with people living with dementia and their care partners.   Manual About Living With Dementia  [facility] [place] (daanow.org)  https://daanow.org/pathways-to-well-being-manual/    Dementia Engagement, Education, and Research (DEER) Program is accepting scholarship applications for [facility] Dementia Basics training certifications in English and Ukrainian for family and professional caregivers. This course provides extensive education about caring for those with cognitive challenges. Contact Elías Reyes at [email or web address] [place] or call [phone] for more information.     You can contact [facility] at [phone] or [email or web address] and click the “Request Help” button. [facility] is a direct link to aging services in [place]. A service navigator can answer most questions you have. They provide resources, service navigation and case management free of charge.       Pounds Preamble Statement (Weight Entered In Details Tab): Reported Weight in pounds:

## 2023-10-10 ENCOUNTER — APPOINTMENT (OUTPATIENT)
Dept: URBAN - METROPOLITAN AREA CLINIC 231 | Age: 23
Setting detail: DERMATOLOGY
End: 2023-10-10

## 2023-10-10 DIAGNOSIS — L21.8 OTHER SEBORRHEIC DERMATITIS: ICD-10-CM

## 2023-10-10 PROCEDURE — OTHER MIPS QUALITY: OTHER

## 2023-10-10 PROCEDURE — OTHER MEDICATION COUNSELING: OTHER

## 2023-10-10 PROCEDURE — 99213 OFFICE O/P EST LOW 20 MIN: CPT

## 2023-10-10 PROCEDURE — OTHER PRESCRIPTION: OTHER

## 2023-10-10 PROCEDURE — OTHER PRESCRIPTION MEDICATION MANAGEMENT: OTHER

## 2023-10-10 PROCEDURE — OTHER COUNSELING: OTHER

## 2023-10-10 RX ORDER — KETOCONAZOLE 20 MG/G
CREAM TOPICAL QD
Qty: 30 | Refills: 1 | Status: ERX | COMMUNITY
Start: 2023-10-10

## 2023-10-10 RX ORDER — CICLOPIROX 10 MG/.96ML
SHAMPOO TOPICAL
Qty: 120 | Refills: 1 | Status: ERX | COMMUNITY
Start: 2023-10-10

## 2023-10-10 ASSESSMENT — LOCATION DETAILED DESCRIPTION DERM
LOCATION DETAILED: MIDDLE STERNUM
LOCATION DETAILED: RIGHT SUPERIOR PARIETAL SCALP
LOCATION DETAILED: LEFT ANTERIOR SHOULDER
LOCATION DETAILED: RIGHT ANTERIOR SHOULDER

## 2023-10-10 ASSESSMENT — LOCATION ZONE DERM
LOCATION ZONE: TRUNK
LOCATION ZONE: SCALP
LOCATION ZONE: ARM

## 2023-10-10 ASSESSMENT — LOCATION SIMPLE DESCRIPTION DERM
LOCATION SIMPLE: SCALP
LOCATION SIMPLE: CHEST
LOCATION SIMPLE: RIGHT SHOULDER
LOCATION SIMPLE: LEFT SHOULDER

## 2023-10-10 NOTE — PROCEDURE: MEDICATION COUNSELING
Xelalbinz Pregnancy And Lactation Text: This medication is Pregnancy Category D and is not considered safe during pregnancy.  The risk during breast feeding is also uncertain.

## 2023-10-10 NOTE — PROCEDURE: MEDICATION COUNSELING
Crescentic Intermediate Repair Preamble Text (Leave Blank If You Do Not Want): Undermining was performed with blunt dissection. Doxycycline Pregnancy And Lactation Text: This medication is Pregnancy Category D and not consider safe during pregnancy. It is also excreted in breast milk but is considered safe for shorter treatment courses.

## 2023-10-10 NOTE — PROCEDURE: PRESCRIPTION MEDICATION MANAGEMENT
Render In Strict Bullet Format?: No
Detail Level: Zone
Initiate Treatment: ciclopirox 1 % shampoo \\nQuantity: 120.0 ml  Days Supply: 30\\nSig: Use to wash scalp 3-4 times weekly, massage into scalp and leave on scalp 5 minutes then wash out.\\n\\nketoconazole 2 % topical cream QD\\nQuantity: 30.0 g  Days Supply: 30\\nSig: Apply to affected areas on extremities and trunk twice daily for two weeks.

## 2023-11-27 NOTE — PROCEDURE: MEDICATION COUNSELING
- Called and LM on OT confidential line concerning below  - Verbal orders given, but request for more information regarding recert vs initial set of orders.    - Will await for call back to verify.    Nando Webber RN  Patient Advocate Liaison (PAL)  [facility]  [phone]    11/27/2023 at 12:31 PM    Propranolol Counseling:  I discussed with the patient the risks of propranolol including but not limited to low heart rate, low blood pressure, low blood sugar, restlessness and increased cold sensitivity. They should call the office if they experience any of these side effects.

## 2024-03-04 NOTE — PROCEDURE: ISOTRETINOIN MONITORING
It was great seeing you today, Consuelo.     We discussed the following today:     SLEEP:  Tried to sleep 8 hours a night at regular intervals if possible. A regular sleep routine is good for your health!   We will do a sleep study to check for sleep apnea as well.       CHEST PAIN/HIGH BLOOD PRESSURE:  You have been referred to cardiology for high blood pressure and chest pain. If you have have trouble breathing, a fast heartbeat, or faint with chest pain, go to the emergency room.   We have also ordered an EKG to look at the electrical activity of your heart and check your heart rhythm.     SCREENING LABS: Please go to the lab next door to obtain the screening labs that were ordered:   CBC (Blood count), Non-fasting LDL (Cholesterol), Liver function test, vitamin D.     NUTRITION REFERRAL PROVIDED:   Call to schedule a free visit with our dietician.  You have also been referred to Dr. Jj who specializes in healthy weight management.     HEALTHY LIFESTYLE:  Please decrease intake of sugared beverages.  Please eat three meals per day on a regular schedule.  Sleep 8-9 hours per night on a regular schedule.  We recommend walking for about 15 minutes after each major meal.  When snacking - take a portion from the container and put in a bowl and put the rest of the food away.  Do not use electronic screens while eating (or prior to sleeping) or you may miss your body's cues that you are full (or tired).  Please have a protein (meat/beans/nut butters) on 1/4 of your meal plate, a starch serving on 1/4 of the plate, and fruits or vegetables on 1/2 the plate. Have seconds of primarily the fruits/vegetables.  Please go outside 30 minutes per day as able.        MENTAL HEALTH RESOURCES:   ACTIVE CRISIS:   Please call 911 or present to the emergency if you are actively having thoughts of self-harm or harm of others.     OTHER RESOURCES:   You can chat one-to-one online at: [email or web address]. Online & texting  chats are available from 9am-3pm M-F. All contacts are confidential.  Call the [facility] Helpline at 8-766-219-[facility] (5013).   Text the Crisis Text Line at 718717 from anywhere in the [place] at anytime. Crisis Text Line is here for any crisis. A live, trained Crisis Counselor receives the text and responds, all from our secure online platform. The volunteer Crisis Counselor will help you move from a hot moment to a cool moment.    You met with our social work team today, and a counseling referral was provided.     FOOD RESOURCES:  You have been referred to  [employer]. This free grocery market provides a week of healthy groceries each month to you for 6 months - we can renew your referral at that time. You will need to go to the market to get groceries. You DO need to find a ride - your medical insurance company has rides that CAN be used to get to [employer].  [facility] Location  [facility], [address] [address].   ** Call the dietician Power Navarrete at [phone] to make an appointment    If you need transportation, let [place] [facility] know at [phone].    OTHER RESOURCES:  Contact [place] [facility] via phone at [phone] or stop by the office upstairs with additional questions/for additional resources.    DENTAL HYGIENE:  Don't forget to brush teeth twice a day!  Information for local dentists was provided - please schedule an appointment for a dental exam.   Apply a grain of rice amount of toothpaste to toothbrush twice a day.  Local dentists in the area include:         VACCINES TO DATE:   Most Recent Immunizations   Administered Date(s) Administered    DTaP HepB IPV combined vaccine, pedatric (PEDIARIX) 2004    DTaP vaccine, pediatric  (INFANRIX) 11/25/2008    Flu vaccine (IIV4), preservative free *Check age/dose* 03/04/2024    HPV 9-valent vaccine (GARDASIL 9) 12/02/2015    HPV, Quadrivalent 05/03/2017    Hepatitis A vaccine, pediatric/adolescent (HAVRIX, VAQTA)  04/18/2008    Hepatitis B vaccine, pediatric/adolescent (RECOMBIVAX, ENGERIX) 04/19/2010    HiB PRP-OMP conjugate vaccine, pediatric (PEDVAXHIB) 09/12/2005    Influenza, Unspecified 01/07/2014    Influenza, seasonal, injectable 11/28/2022    MMR vaccine, subcutaneous (MMR II) 11/25/2008    Meningococcal B, Unspecified 09/25/2021    Meningococcal MCV4P 08/24/2021    Pfizer COVID-19 vaccine, Fall 2023, 12 years and older, (30mcg/0.3mL) 03/04/2024    Pfizer Purple Cap SARS-CoV-2 10/12/2021    Pneumococcal Conjugate PCV 7 11/14/2005    Poliovirus vaccine, subcutaneous (IPOL) 11/25/2008    Tdap vaccine, age 7 year and older (BOOSTRIX, ADACEL) 12/02/2015    Varicella vaccine, subcutaneous (VARIVAX) 11/25/2008     If you have any questions or concerns after our visit, send me a message via my chart! If this doesn't work, you can call the office at [phone], and they will leave me a message to return your call.     It was a pleasure seeing you today!  Kassandra Gardner MD     What Is The Patient's Gender: Female